# Patient Record
Sex: FEMALE | Race: WHITE | NOT HISPANIC OR LATINO | URBAN - METROPOLITAN AREA
[De-identification: names, ages, dates, MRNs, and addresses within clinical notes are randomized per-mention and may not be internally consistent; named-entity substitution may affect disease eponyms.]

---

## 2017-01-16 ENCOUNTER — INPATIENT (INPATIENT)
Facility: HOSPITAL | Age: 32
LOS: 4 days | Discharge: HOME | End: 2017-01-21
Attending: SPECIALIST | Admitting: SPECIALIST
Payer: COMMERCIAL

## 2017-01-17 VITALS — WEIGHT: 192.02 LBS | HEIGHT: 65 IN

## 2017-01-17 LAB
BASOPHILS NFR BLD AUTO: 0.4 % — SIGNIFICANT CHANGE UP (ref 0–2)
BLD GP AB SCN SERPL QL: NEGATIVE — SIGNIFICANT CHANGE UP
BLD GP AB SCN SERPL QL: NEGATIVE — SIGNIFICANT CHANGE UP
EOSINOPHIL NFR BLD AUTO: 1.3 % — SIGNIFICANT CHANGE UP (ref 0–6)
HCT VFR BLD CALC: 31.7 % — LOW (ref 34.5–45)
HGB BLD-MCNC: 10.7 G/DL — LOW (ref 11.5–15.5)
LYMPHOCYTES # BLD AUTO: 28.8 % — SIGNIFICANT CHANGE UP (ref 13–44)
MCHC RBC-ENTMCNC: 31.3 PG — SIGNIFICANT CHANGE UP (ref 27–34)
MCHC RBC-ENTMCNC: 33.8 G/DL — SIGNIFICANT CHANGE UP (ref 32–36)
MCV RBC AUTO: 92.7 FL — SIGNIFICANT CHANGE UP (ref 80–100)
MONOCYTES NFR BLD AUTO: 12.2 % — SIGNIFICANT CHANGE UP (ref 2–14)
NEUTROPHILS NFR BLD AUTO: 57.3 % — SIGNIFICANT CHANGE UP (ref 43–77)
PLATELET # BLD AUTO: 189 K/UL — SIGNIFICANT CHANGE UP (ref 150–400)
RBC # BLD: 3.42 M/UL — LOW (ref 3.8–5.2)
RBC # FLD: 12.3 % — SIGNIFICANT CHANGE UP (ref 10.3–16.9)
RH IG SCN BLD-IMP: POSITIVE — SIGNIFICANT CHANGE UP
RH IG SCN BLD-IMP: POSITIVE — SIGNIFICANT CHANGE UP
T PALLIDUM AB TITR SER: NEGATIVE — SIGNIFICANT CHANGE UP
WBC # BLD: 8.5 K/UL — SIGNIFICANT CHANGE UP (ref 3.8–10.5)
WBC # FLD AUTO: 8.5 K/UL — SIGNIFICANT CHANGE UP (ref 3.8–10.5)

## 2017-01-17 RX ORDER — CEFAZOLIN SODIUM 1 G
2000 VIAL (EA) INJECTION ONCE
Qty: 0 | Refills: 0 | Status: DISCONTINUED | OUTPATIENT
Start: 2017-01-17 | End: 2017-01-18

## 2017-01-17 RX ORDER — OXYTOCIN 10 UNIT/ML
1 VIAL (ML) INJECTION
Qty: 30 | Refills: 0 | Status: DISCONTINUED | OUTPATIENT
Start: 2017-01-17 | End: 2017-01-18

## 2017-01-17 RX ORDER — SODIUM CHLORIDE 9 MG/ML
1000 INJECTION, SOLUTION INTRAVENOUS
Qty: 0 | Refills: 0 | Status: DISCONTINUED | OUTPATIENT
Start: 2017-01-17 | End: 2017-01-18

## 2017-01-17 RX ORDER — SODIUM CHLORIDE 9 MG/ML
1000 INJECTION, SOLUTION INTRAVENOUS ONCE
Qty: 0 | Refills: 0 | Status: COMPLETED | OUTPATIENT
Start: 2017-01-17 | End: 2017-01-17

## 2017-01-17 RX ORDER — OXYTOCIN 10 UNIT/ML
333.33 VIAL (ML) INJECTION
Qty: 20 | Refills: 0 | Status: DISCONTINUED | OUTPATIENT
Start: 2017-01-17 | End: 2017-01-18

## 2017-01-17 RX ORDER — CITRIC ACID/SODIUM CITRATE 300-500 MG
15 SOLUTION, ORAL ORAL EVERY 4 HOURS
Qty: 0 | Refills: 0 | Status: DISCONTINUED | OUTPATIENT
Start: 2017-01-17 | End: 2017-01-18

## 2017-01-17 RX ORDER — CITRIC ACID/SODIUM CITRATE 300-500 MG
30 SOLUTION, ORAL ORAL ONCE
Qty: 0 | Refills: 0 | Status: DISCONTINUED | OUTPATIENT
Start: 2017-01-17 | End: 2017-01-18

## 2017-01-17 RX ADMIN — Medication 30 MILLILITER(S): at 23:55

## 2017-01-17 RX ADMIN — SODIUM CHLORIDE 2000 MILLILITER(S): 9 INJECTION, SOLUTION INTRAVENOUS at 18:25

## 2017-01-17 RX ADMIN — SODIUM CHLORIDE 125 MILLILITER(S): 9 INJECTION, SOLUTION INTRAVENOUS at 03:06

## 2017-01-17 RX ADMIN — SODIUM CHLORIDE 125 MILLILITER(S): 9 INJECTION, SOLUTION INTRAVENOUS at 14:52

## 2017-01-17 RX ADMIN — Medication 1 MILLIUNIT(S)/MIN: at 09:40

## 2017-01-17 RX ADMIN — SODIUM CHLORIDE 125 MILLILITER(S): 9 INJECTION, SOLUTION INTRAVENOUS at 06:46

## 2017-01-17 RX ADMIN — Medication 100 MILLIGRAM(S): at 23:55

## 2017-01-17 NOTE — PATIENT PROFILE OB - VISION (WITH CORRECTIVE LENSES IF THE PATIENT USUALLY WEARS THEM):
wears glasses and contacts/Partially impaired: cannot see medication labels or newsprint, but can see obstacles in path, and the surrounding layout; can count fingers at arm's length

## 2017-01-18 LAB
HCT VFR BLD CALC: 25.1 % — LOW (ref 34.5–45)
HGB BLD-MCNC: 8.4 G/DL — LOW (ref 11.5–15.5)
MCHC RBC-ENTMCNC: 30.7 PG — SIGNIFICANT CHANGE UP (ref 27–34)
MCHC RBC-ENTMCNC: 33.5 G/DL — SIGNIFICANT CHANGE UP (ref 32–36)
MCV RBC AUTO: 91.6 FL — SIGNIFICANT CHANGE UP (ref 80–100)
PLATELET # BLD AUTO: 187 K/UL — SIGNIFICANT CHANGE UP (ref 150–400)
RBC # BLD: 2.74 M/UL — LOW (ref 3.8–5.2)
RBC # FLD: 12.5 % — SIGNIFICANT CHANGE UP (ref 10.3–16.9)
WBC # BLD: 12.8 K/UL — HIGH (ref 3.8–10.5)
WBC # FLD AUTO: 12.8 K/UL — HIGH (ref 3.8–10.5)

## 2017-01-18 RX ORDER — DOCUSATE SODIUM 100 MG
100 CAPSULE ORAL
Qty: 0 | Refills: 0 | Status: DISCONTINUED | OUTPATIENT
Start: 2017-01-18 | End: 2017-01-21

## 2017-01-18 RX ORDER — ACETAMINOPHEN 500 MG
1000 TABLET ORAL ONCE
Qty: 0 | Refills: 0 | Status: DISCONTINUED | OUTPATIENT
Start: 2017-01-18 | End: 2017-01-21

## 2017-01-18 RX ORDER — IBUPROFEN 200 MG
600 TABLET ORAL EVERY 6 HOURS
Qty: 0 | Refills: 0 | Status: DISCONTINUED | OUTPATIENT
Start: 2017-01-18 | End: 2017-01-21

## 2017-01-18 RX ORDER — TETANUS TOXOID, REDUCED DIPHTHERIA TOXOID AND ACELLULAR PERTUSSIS VACCINE, ADSORBED 5; 2.5; 8; 8; 2.5 [IU]/.5ML; [IU]/.5ML; UG/.5ML; UG/.5ML; UG/.5ML
0.5 SUSPENSION INTRAMUSCULAR ONCE
Qty: 0 | Refills: 0 | Status: DISCONTINUED | OUTPATIENT
Start: 2017-01-18 | End: 2017-01-21

## 2017-01-18 RX ORDER — KETOROLAC TROMETHAMINE 30 MG/ML
15 SYRINGE (ML) INJECTION EVERY 6 HOURS
Qty: 0 | Refills: 0 | Status: DISCONTINUED | OUTPATIENT
Start: 2017-01-18 | End: 2017-01-21

## 2017-01-18 RX ORDER — SIMETHICONE 80 MG/1
80 TABLET, CHEWABLE ORAL EVERY 4 HOURS
Qty: 0 | Refills: 0 | Status: DISCONTINUED | OUTPATIENT
Start: 2017-01-18 | End: 2017-01-21

## 2017-01-18 RX ORDER — KETOROLAC TROMETHAMINE 30 MG/ML
15 SYRINGE (ML) INJECTION EVERY 8 HOURS
Qty: 0 | Refills: 0 | Status: DISCONTINUED | OUTPATIENT
Start: 2017-01-18 | End: 2017-01-21

## 2017-01-18 RX ORDER — HEPARIN SODIUM 5000 [USP'U]/ML
5000 INJECTION INTRAVENOUS; SUBCUTANEOUS EVERY 12 HOURS
Qty: 0 | Refills: 0 | Status: DISCONTINUED | OUTPATIENT
Start: 2017-01-18 | End: 2017-01-19

## 2017-01-18 RX ORDER — SODIUM CHLORIDE 9 MG/ML
1000 INJECTION, SOLUTION INTRAVENOUS
Qty: 0 | Refills: 0 | Status: DISCONTINUED | OUTPATIENT
Start: 2017-01-18 | End: 2017-01-18

## 2017-01-18 RX ORDER — SODIUM CHLORIDE 9 MG/ML
1000 INJECTION, SOLUTION INTRAVENOUS
Qty: 0 | Refills: 0 | Status: DISCONTINUED | OUTPATIENT
Start: 2017-01-18 | End: 2017-01-21

## 2017-01-18 RX ORDER — ONDANSETRON 8 MG/1
4 TABLET, FILM COATED ORAL EVERY 6 HOURS
Qty: 0 | Refills: 0 | Status: DISCONTINUED | OUTPATIENT
Start: 2017-01-18 | End: 2017-01-21

## 2017-01-18 RX ORDER — OXYTOCIN 10 UNIT/ML
333.33 VIAL (ML) INJECTION
Qty: 20 | Refills: 0 | Status: DISCONTINUED | OUTPATIENT
Start: 2017-01-18 | End: 2017-01-21

## 2017-01-18 RX ORDER — FERROUS SULFATE 325(65) MG
325 TABLET ORAL DAILY
Qty: 0 | Refills: 0 | Status: DISCONTINUED | OUTPATIENT
Start: 2017-01-18 | End: 2017-01-21

## 2017-01-18 RX ORDER — OXYTOCIN 10 UNIT/ML
41.67 VIAL (ML) INJECTION
Qty: 20 | Refills: 0 | Status: DISCONTINUED | OUTPATIENT
Start: 2017-01-18 | End: 2017-01-21

## 2017-01-18 RX ORDER — GLYCERIN ADULT
1 SUPPOSITORY, RECTAL RECTAL AT BEDTIME
Qty: 0 | Refills: 0 | Status: DISCONTINUED | OUTPATIENT
Start: 2017-01-18 | End: 2017-01-21

## 2017-01-18 RX ORDER — MORPHINE SULFATE 50 MG/1
4 CAPSULE, EXTENDED RELEASE ORAL ONCE
Qty: 0 | Refills: 0 | Status: DISCONTINUED | OUTPATIENT
Start: 2017-01-18 | End: 2017-01-21

## 2017-01-18 RX ORDER — OXYTOCIN 10 UNIT/ML
41.67 VIAL (ML) INJECTION
Qty: 20 | Refills: 0 | Status: DISCONTINUED | OUTPATIENT
Start: 2017-01-18 | End: 2017-01-18

## 2017-01-18 RX ORDER — NALOXONE HYDROCHLORIDE 4 MG/.1ML
0.1 SPRAY NASAL
Qty: 0 | Refills: 0 | Status: DISCONTINUED | OUTPATIENT
Start: 2017-01-18 | End: 2017-01-21

## 2017-01-18 RX ORDER — LANOLIN
1 OINTMENT (GRAM) TOPICAL
Qty: 0 | Refills: 0 | Status: DISCONTINUED | OUTPATIENT
Start: 2017-01-18 | End: 2017-01-21

## 2017-01-18 RX ORDER — HYDROMORPHONE HYDROCHLORIDE 2 MG/ML
0.5 INJECTION INTRAMUSCULAR; INTRAVENOUS; SUBCUTANEOUS
Qty: 0 | Refills: 0 | Status: DISCONTINUED | OUTPATIENT
Start: 2017-01-18 | End: 2017-01-21

## 2017-01-18 RX ORDER — DIPHENHYDRAMINE HCL 50 MG
25 CAPSULE ORAL EVERY 6 HOURS
Qty: 0 | Refills: 0 | Status: DISCONTINUED | OUTPATIENT
Start: 2017-01-18 | End: 2017-01-21

## 2017-01-18 RX ORDER — ACETAMINOPHEN 500 MG
650 TABLET ORAL EVERY 6 HOURS
Qty: 0 | Refills: 0 | Status: DISCONTINUED | OUTPATIENT
Start: 2017-01-18 | End: 2017-01-21

## 2017-01-18 RX ADMIN — HYDROMORPHONE HYDROCHLORIDE 0.5 MILLIGRAM(S): 2 INJECTION INTRAMUSCULAR; INTRAVENOUS; SUBCUTANEOUS at 02:29

## 2017-01-18 RX ADMIN — Medication 15 MILLIGRAM(S): at 04:46

## 2017-01-18 RX ADMIN — Medication 600 MILLIGRAM(S): at 13:49

## 2017-01-18 RX ADMIN — Medication 325 MILLIGRAM(S): at 13:48

## 2017-01-18 RX ADMIN — Medication 600 MILLIGRAM(S): at 14:00

## 2017-01-18 RX ADMIN — Medication 1 TABLET(S): at 13:47

## 2017-01-18 RX ADMIN — HYDROMORPHONE HYDROCHLORIDE 0.5 MILLIGRAM(S): 2 INJECTION INTRAMUSCULAR; INTRAVENOUS; SUBCUTANEOUS at 02:05

## 2017-01-18 RX ADMIN — SODIUM CHLORIDE 125 MILLILITER(S): 9 INJECTION, SOLUTION INTRAVENOUS at 09:27

## 2017-01-18 RX ADMIN — Medication 15 MILLIGRAM(S): at 05:40

## 2017-01-18 RX ADMIN — Medication 100 MILLIGRAM(S): at 13:49

## 2017-01-18 NOTE — PROGRESS NOTE ADULT - SUBJECTIVE AND OBJECTIVE BOX
Returned to pt bedside to reassess pt.  Denies continued bleeding since previous visit.  Feels well overall.  Has not changed pad.  Fundus firm.  Vitals WNL.  Will alert team if she notes bleeding. Late entry from earlier exam:    Returned to pt bedside to reassess pt.  Denies continued bleeding since previous visit.  Feels well overall.  Has not changed pad.  Fundus firm.  Vitals WNL.  Will alert team if she notes bleeding.

## 2017-01-18 NOTE — PROGRESS NOTE ADULT - SUBJECTIVE AND OBJECTIVE BOX
Patient evaluated at bedside.   She reports pain is well controlled with toradol   She denies heavy vaginal bleeding.  She has SCDs and blevins in place, has not yet passed gas, tolerating clear liduqid.     Physical Exam:  Vital Signs Last 24 Hrs  T(C): 36.6, Max: 37 (01-18 @ 01:28)  T(F): 97.8, Max: 98.6 (01-18 @ 01:28)  HR: 76 (70 - 80)  BP: 128/84 (85/50 - 128/84)  BP(mean): --  RR: 16 (14 - 17)  SpO2: 98% (97% - 99%)    GA: NAD, A+0 x 3  Abd: soft, nontender, nondistended, no rebound or guarding, incision clean, dry and intact, uterus firm at midline, 0 fb below umbilicus  : lochia WNL  Blevins: in place  Extremities: no calf tenderness                            10.7   8.5   )-----------( 189      ( 17 Jan 2017 03:24 )             31.7

## 2017-01-18 NOTE — PROGRESS NOTE ADULT - SUBJECTIVE AND OBJECTIVE BOX
Called to pt's bedside to assess vaginal bleeding.  Nurse reports passage of clot after getting up out of bed.  Pt notes that she has felt gushes of blood when getting out of bed after laying for several hours.  Denies any dizziness/lightheadedness, no chest pain/SOB.  Pain well controlled at this time.  No nausea/vomiting.      Physical exam:  ICU Vital Signs Last 24 Hrs  T(C): 36.7, Max: 37 (01-18 @ 01:28)  T(F): 98, Max: 98.6 (01-18 @ 01:28)  HR: 84 (62 - 84)  BP: 102/76 (85/50 - 128/84)  BP(mean): --  ABP: --  ABP(mean): --  RR: 18 (14 - 18)  SpO2: 97% (96% - 99%)    Gen: pt appears well, NAD, AAOx3;   Abd: nondistended, appropriately tender to palpation, no rebound or guarding.  Fundus firm at midline.  Pressure dressing in place, c/d/i.  Pad examined, minimal BRB noted despite fundal massage.   Ext: warm/well perfused, edema, nontender to palpation, SCDs in place    Pt refusing bimanual exam due to pain.  Discussed risks/benefits of postpartum bleeding.  Plan to take percocet, will return to examine pt after.

## 2017-01-18 NOTE — PROGRESS NOTE ADULT - ASSESSMENT
A/P  31yyos/p c/s, POD #0  ,stable  1. Pain: well controlled on PO pain meds  2. GI: advance to regular with flatus  3. : remove blevins at 1pm  4. DVT prophylaxis: ambulation, SCDs  5. Dispo: POD 3 or 4

## 2017-01-19 RX ADMIN — Medication 600 MILLIGRAM(S): at 21:30

## 2017-01-19 RX ADMIN — SIMETHICONE 80 MILLIGRAM(S): 80 TABLET, CHEWABLE ORAL at 04:35

## 2017-01-19 RX ADMIN — Medication 600 MILLIGRAM(S): at 20:42

## 2017-01-19 RX ADMIN — Medication 100 MILLIGRAM(S): at 14:40

## 2017-01-19 RX ADMIN — Medication 100 MILLIGRAM(S): at 04:35

## 2017-01-19 RX ADMIN — Medication 600 MILLIGRAM(S): at 03:06

## 2017-01-19 RX ADMIN — Medication 600 MILLIGRAM(S): at 09:40

## 2017-01-19 RX ADMIN — Medication 600 MILLIGRAM(S): at 15:20

## 2017-01-19 RX ADMIN — SIMETHICONE 80 MILLIGRAM(S): 80 TABLET, CHEWABLE ORAL at 14:40

## 2017-01-19 RX ADMIN — Medication 600 MILLIGRAM(S): at 08:50

## 2017-01-19 RX ADMIN — Medication 325 MILLIGRAM(S): at 14:39

## 2017-01-19 RX ADMIN — Medication 1 TABLET(S): at 14:39

## 2017-01-19 RX ADMIN — Medication 600 MILLIGRAM(S): at 14:40

## 2017-01-19 RX ADMIN — Medication 600 MILLIGRAM(S): at 02:36

## 2017-01-19 NOTE — PROGRESS NOTE ADULT - ASSESSMENT
A/P  30yo  s/p c/s, POD #  ,stable  1. Pain: well controlled on PO pain meds  2. GI: reg diet  3. : voiding  4. DVT prophylaxis: ambulation  5. Dispo: POD 3 or 4

## 2017-01-19 NOTE — PROGRESS NOTE ADULT - SUBJECTIVE AND OBJECTIVE BOX
Patient evaluated at bedside.   She reports pain with movement. Pt tolerating pain at this time with motrin and percocet  She denies heavy vaginal bleeding.  She has difficulty ambulating due to pain, voiding spontaneously, passing gas, tolerating regular diet and is breastfeeding.    Physical Exam:  Vital Signs Last 24 Hrs  T(C): 36.7, Max: 36.9 (01-18 @ 10:40)  T(F): 98, Max: 98.4 (01-18 @ 10:40)  HR: 98 (71 - 98)  BP: 121/86 (102/76 - 121/86)  BP(mean): --  RR: 16 (16 - 18)  SpO2: 96% (96% - 98%)    GA: NAD, A+0 x 3  Abd: soft, nontender, nondistended, no rebound or guarding, incision clean, dry and intact, uterus firm at midline, 0  fb below umbilicus  : lochia WNL  Extremities: no calf tenderness                            8.4    12.8  )-----------( 187      ( 18 Jan 2017 10:49 )             25.1

## 2017-01-20 RX ADMIN — Medication 600 MILLIGRAM(S): at 09:00

## 2017-01-20 RX ADMIN — Medication 600 MILLIGRAM(S): at 03:37

## 2017-01-20 RX ADMIN — Medication 600 MILLIGRAM(S): at 14:34

## 2017-01-20 RX ADMIN — Medication 100 MILLIGRAM(S): at 08:22

## 2017-01-20 RX ADMIN — Medication 600 MILLIGRAM(S): at 15:10

## 2017-01-20 RX ADMIN — Medication 600 MILLIGRAM(S): at 08:19

## 2017-01-20 RX ADMIN — Medication 325 MILLIGRAM(S): at 14:34

## 2017-01-20 RX ADMIN — Medication 1 TABLET(S): at 14:33

## 2017-01-20 RX ADMIN — Medication 600 MILLIGRAM(S): at 02:37

## 2017-01-20 RX ADMIN — Medication 600 MILLIGRAM(S): at 20:55

## 2017-01-20 RX ADMIN — Medication 600 MILLIGRAM(S): at 21:36

## 2017-01-20 RX ADMIN — Medication 100 MILLIGRAM(S): at 20:56

## 2017-01-20 NOTE — PROGRESS NOTE ADULT - ASSESSMENT
A/P  32yo s/p c/s, POD #2  ,stable  1. Pain: well controlled on PO pain meds  2. GI: regular diet  3. : voiding  4. DVT prophylaxis: ambulation, SCDs  5. Dispo: POD 3 or 4

## 2017-01-20 NOTE — DISCHARGE NOTE OB - CARE PROVIDER_API CALL
Ermias Brice), Obstetrics and Gynecology  [address] [place] [id number]  Phone: [phone]  Fax: [phone]

## 2017-01-20 NOTE — DISCHARGE NOTE OB - HOSPITAL COURSE
c/s for arrest on 1/18. Hospital course uneventful. Pt ambulating and tolerating PO at the time of discharge. VSS

## 2017-01-20 NOTE — DISCHARGE NOTE OB - PATIENT PORTAL LINK FT
“You can access the [employer] Patient Portal, offered by [facility], by registering with the following website: [email or web address]”

## 2017-01-20 NOTE — DISCHARGE NOTE OB - PLAN OF CARE
Return home Resume normal daily activities. Pelvic rest and no heavy lifting. Keep incision clean and dry. Return to clinic in 2 weeks

## 2017-01-20 NOTE — PROGRESS NOTE ADULT - SUBJECTIVE AND OBJECTIVE BOX
Patient evaluated at bedside.   She reports pain is well controlled with percocet and motrin  She denies heavy vaginal bleeding.  She has been ambulating without assistance, voiding spontaneously, passing gas, tolerating regular diet and is breastfeeding.    Physical Exam:  Vital Signs Last 24 Hrs  T(C): 36.8, Max: 36.8 (01-20 @ 04:40)  T(F): 98.3, Max: 98.3 (01-20 @ 04:40)  HR: 88 (68 - 90)  BP: 104/69 (104/69 - 112/78)  BP(mean): --  RR: 16 (16 - 18)  SpO2: 97% (97% - 99%)    GA: NAD, A+0 x 3  Abd: soft, nontender, nondistended, no rebound or guarding, incision clean, dry and intact, uterus firm at midline  : lochia WNL  Extremities: no calf tenderness                            8.4    12.8  )-----------( 187      ( 18 Jan 2017 10:49 )             25.1

## 2017-01-21 VITALS
TEMPERATURE: 98 F | RESPIRATION RATE: 18 BRPM | DIASTOLIC BLOOD PRESSURE: 69 MMHG | OXYGEN SATURATION: 99 % | HEART RATE: 92 BPM | SYSTOLIC BLOOD PRESSURE: 100 MMHG

## 2017-01-21 PROCEDURE — 36415 COLL VENOUS BLD VENIPUNCTURE: CPT

## 2017-01-21 PROCEDURE — 86901 BLOOD TYPING SEROLOGIC RH(D): CPT

## 2017-01-21 PROCEDURE — 88307 TISSUE EXAM BY PATHOLOGIST: CPT

## 2017-01-21 PROCEDURE — 86850 RBC ANTIBODY SCREEN: CPT

## 2017-01-21 PROCEDURE — 85025 COMPLETE CBC W/AUTO DIFF WBC: CPT

## 2017-01-21 PROCEDURE — 86900 BLOOD TYPING SEROLOGIC ABO: CPT

## 2017-01-21 PROCEDURE — 86780 TREPONEMA PALLIDUM: CPT

## 2017-01-21 PROCEDURE — 85027 COMPLETE CBC AUTOMATED: CPT

## 2017-01-21 RX ADMIN — Medication 600 MILLIGRAM(S): at 03:48

## 2017-01-21 RX ADMIN — Medication 600 MILLIGRAM(S): at 17:14

## 2017-01-21 RX ADMIN — Medication 600 MILLIGRAM(S): at 09:44

## 2017-01-21 RX ADMIN — Medication 600 MILLIGRAM(S): at 14:15

## 2017-01-21 RX ADMIN — Medication 600 MILLIGRAM(S): at 02:40

## 2017-01-21 RX ADMIN — Medication 100 MILLIGRAM(S): at 08:46

## 2017-01-21 RX ADMIN — Medication 600 MILLIGRAM(S): at 08:44

## 2017-01-21 NOTE — PROGRESS NOTE ADULT - ASSESSMENT
A/P  32yo s/p c/s, POD #3  ,stable  1. Pain: well controlled on PO pain meds  2. GI: reg diet  3. : voiding  4. DVT prophylaxis: SCDs and ambulation  5. Dispo: POD 4

## 2017-01-21 NOTE — PROGRESS NOTE ADULT - SUBJECTIVE AND OBJECTIVE BOX
Patient evaluated at bedside.   She reports pain is well controlled with percocet and motrin  She denies heavy vaginal bleeding.  She has been ambulating without assistance, voiding spontaneously, passing gas, tolerating regular diet and is breastfeeding.    Physical Exam:  Vital Signs Last 24 Hrs  T(C): 36.6, Max: 36.7 (01-20 @ 14:30)  T(F): 97.8, Max: 98 (01-20 @ 14:30)  HR: 92 (78 - 95)  BP: 100/69 (100/69 - 125/85)  BP(mean): --  RR: 18 (17 - 18)  SpO2: 99% (98% - 99%)    GA: NAD, A+0 x 3  Abd: soft, nontender, nondistended, no rebound or guarding, incision clean, dry and intact, uterus firm at midline  : lochia WNL  Extremities: no calf tenderness

## 2017-01-23 DIAGNOSIS — O40.3XX0 POLYHYDRAMNIOS, THIRD TRIMESTER, NOT APPLICABLE OR UNSPECIFIED: ICD-10-CM

## 2017-01-23 LAB — SURGICAL PATHOLOGY STUDY: SIGNIFICANT CHANGE UP

## 2017-01-24 DIAGNOSIS — O35.8XX0 MATERNAL CARE FOR OTHER (SUSPECTED) FETAL ABNORMALITY AND DAMAGE, NOT APPLICABLE OR UNSPECIFIED: ICD-10-CM

## 2017-01-24 DIAGNOSIS — Z3A.39 39 WEEKS GESTATION OF PREGNANCY: ICD-10-CM

## 2019-05-07 NOTE — PATIENT PROFILE OB - FUNCTIONAL SCREEN CURRENT LEVEL: DRESSING, MLM
Patient: Simi Green    Procedure Summary     Date:  05/07/19 Room / Location:  [facility] OR  / [facility] OR    Anesthesia Start:  0720 Anesthesia Stop:  0844    Procedure:  RIGHT TYMPANOPLASTY WITH CARTILAGE GRAFT  (Right Ear) Diagnosis:       Perforated ear drum, right      (Perforated ear drum, right [H72.91])    Surgeon:  Gilberto Batista MD Provider:  Priyank Albarran MD    Anesthesia Type:  general ASA Status:  2          Anesthesia Type: general  Last vitals  BP   (!) 98/55 (05/07/19 0555)   Temp   97.9 °F (36.6 °C) (05/07/19 0555)   Pulse   78 (05/07/19 0555)   Resp   20 (05/07/19 0555)     SpO2   99 % (05/07/19 0555)     Post Anesthesia Care and Evaluation    Patient location during evaluation: bedside  Patient participation: waiting for patient participation  Level of consciousness: sleepy but conscious  Pain score: 0  Pain management: adequate  Airway patency: patent  Anesthetic complications: No anesthetic complications  PONV Status: none  Cardiovascular status: acceptable and stable  Respiratory status: acceptable and oral airway  Hydration status: stable       (0) independent